# Patient Record
Sex: MALE | Race: WHITE | NOT HISPANIC OR LATINO | Employment: FULL TIME | URBAN - NONMETROPOLITAN AREA
[De-identification: names, ages, dates, MRNs, and addresses within clinical notes are randomized per-mention and may not be internally consistent; named-entity substitution may affect disease eponyms.]

---

## 2017-08-28 RX ORDER — ASPIRIN 81 MG/1
81 TABLET ORAL DAILY
COMMUNITY
End: 2017-10-05

## 2017-08-28 RX ORDER — CARVEDILOL 3.12 MG/1
3.12 TABLET ORAL
COMMUNITY
Start: 2017-08-23 | End: 2017-10-05

## 2017-08-28 RX ORDER — ATORVASTATIN CALCIUM 10 MG/1
10 TABLET, FILM COATED ORAL
COMMUNITY
Start: 2017-04-24 | End: 2018-04-20

## 2017-08-28 RX ORDER — VALSARTAN 320 MG/1
320 TABLET ORAL
COMMUNITY
Start: 2017-03-29

## 2017-08-28 RX ORDER — NITROGLYCERIN 0.4 MG/1
0.4 TABLET SUBLINGUAL
COMMUNITY
Start: 2017-08-22 | End: 2017-10-05

## 2017-08-28 RX ORDER — DICYCLOMINE HYDROCHLORIDE 10 MG/1
10 CAPSULE ORAL
COMMUNITY
Start: 2017-08-24 | End: 2017-08-29 | Stop reason: ALTCHOICE

## 2017-08-28 RX ORDER — ISOSORBIDE MONONITRATE 30 MG/1
15 TABLET, EXTENDED RELEASE ORAL
COMMUNITY
Start: 2017-08-25 | End: 2017-09-07

## 2017-08-29 ENCOUNTER — OFFICE VISIT (OUTPATIENT)
Dept: CARDIOLOGY | Facility: CLINIC | Age: 52
End: 2017-08-29

## 2017-08-29 VITALS
DIASTOLIC BLOOD PRESSURE: 78 MMHG | HEART RATE: 62 BPM | SYSTOLIC BLOOD PRESSURE: 152 MMHG | HEIGHT: 70 IN | OXYGEN SATURATION: 98 % | BODY MASS INDEX: 27.69 KG/M2 | WEIGHT: 193.38 LBS

## 2017-08-29 DIAGNOSIS — R07.2 PRECORDIAL PAIN: Primary | ICD-10-CM

## 2017-08-29 DIAGNOSIS — I10 ESSENTIAL HYPERTENSION: ICD-10-CM

## 2017-08-29 DIAGNOSIS — E78.2 MIXED HYPERLIPIDEMIA: ICD-10-CM

## 2017-08-29 PROCEDURE — 99214 OFFICE O/P EST MOD 30 MIN: CPT | Performed by: NURSE PRACTITIONER

## 2017-08-29 NOTE — PROGRESS NOTES
Subjective:     Chest Pain (chief complaint )  Referred: Dr BUBBA Valadez    History of Present Illness  The patient is a 52-year-old  gentleman with history of hypertension, hyperlipidemia who presents for evaluation of chest discomfort as outlined below.    Chest Pain:  Location: Substernal   Duration: Intermittent over two weeks  Quality: Sharp   Intensity: Mild to moderate  Precipitating factors: None identified  Aggravating factors: Activity  Alleviating factors: Rest and nitrate therapy  Associated symptoms: Shortness of air    Family history is significant for coronary artery disease in multiple first-degree relatives however does not appear to be a pattern of premature onset.    The patient reports cardiac evaluation with Dr. Conor Porter.  He indicates that he is scheduled for transthoracic echocardiogram and Lexiscan myocardial perfusion scan next week under the direction of this provider, however as he and his wife were not entirely comfortable with communication in the above-noted office decision was made to discuss further with primary care provider, Dr. Valadez.   Nitrate therapy, MPIOTR or along with carvedilol was initiated by Dr Perdomo.  Due to nausea associated with initial Imdur at 30 mg in following with the recommendation per Dr. Rory M.D. or has been decreased to half a tablet daily.  The patient reports improvement in his symptoms with the initiation of beta blocker and nitrate therapy.    Review of Systems   Constitution: Negative for chills, fever and malaise/fatigue.   Cardiovascular: Positive for chest pain. Negative for claudication, cyanosis, dyspnea on exertion, irregular heartbeat, leg swelling, near-syncope, orthopnea, palpitations, paroxysmal nocturnal dyspnea and syncope.   Gastrointestinal: Negative for bloating.   Neurological: Negative for dizziness and vertigo.     Past Medical History:   Diagnosis Date   • Osteoarthritis of foot joint    • Plantar fasciitis   "    History reviewed. No pertinent surgical history.    Social History     Social History   • Marital status:      Spouse name: N/A   • Number of children: N/A   • Years of education: N/A     Social History Main Topics   • Smoking status: Former Smoker     Types: Cigarettes     Quit date: 8/15/2017   • Smokeless tobacco: Never Used   • Alcohol use No   • Drug use: No   • Sexual activity: Defer     Other Topics Concern   • None     Social History Narrative     Current Outpatient Prescriptions   Medication Sig Dispense Refill   • amLODIPine (NORVASC) 5 MG tablet Take 5 mg by mouth Daily.     • aspirin 81 MG EC tablet Take 81 mg by mouth Daily.     • atorvastatin (LIPITOR) 10 MG tablet Take 10 mg by mouth.     • carvedilol (COREG) 3.125 MG tablet Take 3.125 mg by mouth.     • isosorbide mononitrate (IMDUR) 30 MG 24 hr tablet Take 15 mg by mouth.     • loratadine (CLARITIN) 10 MG tablet Take 10 mg by mouth Daily.     • nitroglycerin (NITROSTAT) 0.4 MG SL tablet Place 0.4 mg under the tongue.     • Tiotropium Bromide Monohydrate 2.5 MCG/ACT aerosol solution Inhale 5 mcg.     • valsartan (DIOVAN) 320 MG tablet Take 320 mg by mouth.       No current facility-administered medications for this visit.      Objective:     Vitals:    08/29/17 0857   BP: 152/78   BP Location: Left arm   Patient Position: Sitting   Cuff Size: Adult   Pulse: 62   SpO2: 98%   Weight: 193 lb 6 oz (87.7 kg)   Height: 70\" (177.8 cm)      Physical Exam   Constitutional: He is oriented to person, place, and time. He appears well-nourished. No distress.   HENT:   Head: Atraumatic.   Neck: No JVD present.   Cardiovascular: S1 normal and S2 normal.  Exam reveals no gallop, no S3 and no S4.    No murmur heard.  Pulmonary/Chest: Effort normal. No respiratory distress. He has no wheezes.   Abdominal: He exhibits no distension.   Musculoskeletal: He exhibits no edema.   Neurological: He is alert and oriented to person, place, and time.   Skin: Skin is " "warm and dry. He is not diaphoretic.   Psychiatric: He has a normal mood and affect. His behavior is normal. Judgment and thought content normal.   Vitals reviewed.    Flowsheet Rows         First Filed Value    Admission Height  70\" (177.8 cm) Documented at 08/29/2017 0857    Admission Weight  193 lb 6 oz (87.7 kg) Documented at 08/29/2017 0857      CBC With Differential (08/22/2017 4:50 PM)  CBC With Differential (08/22/2017 4:50 PM)   Component Value Ref Range   White Blood Cells 7.3 4.2 - 10.2   Red Blood Cells 4.46 4.10 - 5.70   Hemoglobin 13.9 12.9 - 17.4 g/dL   Hematocrit 41.1 37.6 - 49.5 %   MCV 92.2 80.0 - 97.0 fL   MCH 31.2 27.0 - 33.2 pg   MCHC 33.8 32.7 - 35.6 g/dL   RDW 12.8 12.7 - 15.7 %   Platelet Count 192 145 - 375   Percent Neutrophils 51.6 51.2 - 72.0 %   Percent Lymphs 31.9 21.7 - 43.3 %   Percent Monocytes 12.7 (H) 0.0 - 12.0 %   Percent Eosinophils 3.4 0.0 - 5.0 %   Percent basophil 0.4 0.0 - 2.0 %   Absolute Neutrophils 3.8 2.0 - 7.8   Absolute Lymphocytes 2.3 0.9 - 4.4   Absolute Monocytes 0.9 0.0 - 1.0   Absolute Eosiniphils 0.3 0.0 - 0.5   Absolute Basophils 0.0 0.0 - 0.2     CBC With Differential (08/22/2017 4:50 PM)   Specimen Performing Laboratory   Whole Blood [facility]    CLIA ID# [id number]    Dr. Danyel Francis (Director)    [address] [place] [id number]       [facility] (Mass MB) (08/22/2017 4:50 PM)  [facility] (Mass MB) (08/22/2017 4:50 PM)   Component Value Ref Range   Mass MB 0.87 0.00 - 2.37 ng/mL     [facility] (Mass MB) (08/22/2017 4:50 PM)   Specimen Performing Laboratory   Plasma [facility] LAB[facility]    CLIA ID# [id number]    Dr. Danyel Francis (Director)    [address] [place] [id number]       Comprehensive metabolic panel (08/22/2017 4:50 PM)  Comprehensive metabolic panel (08/22/2017 4:50 PM)   Component Value Ref Range   Glucose 91 65 - 99 mg/dL   BUN 16 9 - 21 mg/dL   Creatinine 1.01 0.66 - 1.25 mg/dL   Sodium 141 " 137.0 - 147.0 mmol/L   Potassium 4.3 3.5 - 5.1 mmol/L   Chloride 104 98.0 - 110.0 mmol/L   Carbon Dioxide 27 23.0 - 31.0 mmol/L   Calcium 9.6 8.40 - 10.40 mg/dL   Protein 7.3 6.60 - 8.70 g/dl   Albumin 4.4 3.40 - 4.80 g/dl   SGOT- AST 29 17 - 59 IU/L   SGPT- ALT 41 21 - 72 IU/L   Alkaline Phosphatase 74 38 - 134 IU/L   Bilirubin 0.30 0.2 - 1.30 mg/dL   Hours Post Prandial 3     Glomerular Filtration Rate, Est. 77 mL/min   If -American 93  Comment:       GFR    WITH KIDNEY DAMAGE     W/O DAMAGE  >=90         STAGE 1            NORMAL  60-89        STAGE 2            DEC GFR  30-59        STAGE 3            STAGE 3  15-29        STAGE 4            STAGE 4  <15          STAGE 5            STAGE 5      The MDRD GFR formula is valid only for adults b/w age19 and 70.           Cardiographics   Normal sinus rhythm~Normal ECG~No previous ECGs available~Confirmed by Barry Perdomo DO ([id number]) on 8/24/2017 4:12:10 PM     CXR: 08/22/2017  impression: borderline cardiomegaly, otherwise negative       The following portions of the patient's history were reviewed and updated as appropriate: allergies, current medications, past family history, past medical history, past social history, past surgical history and problem list.     Assessment:      Diagnosis Plan   1. Precordial pain  Stress Test With Myocardial Perfusion One Day    Adult Transthoracic Echo Complete   2. Essential hypertension  Adult Transthoracic Echo Complete   3. Mixed hyperlipidemia        Mr. Aguilar presents with chest discomfort in the setting of high probability for coronary artery disease based on pretest probability.  Chest pain does appear to be typical;   Electrocardiogram is noted with normal sinus rhythm with no evidence of ST-T segment abnormality;    Discussion with the patient regarding the above noted procedural plans for which all questions have been answered.  Transthoracic echocardiogram will be performed today - Dr. Mathur to  interpret.  Imdur and carvedilol will be held on the morning of noted date myocardial perfusion scan - Dr. Mathur to interpret.    Further recommendations will be based upon above findings/response.

## 2017-09-01 ENCOUNTER — TELEPHONE (OUTPATIENT)
Dept: CARDIOLOGY | Facility: CLINIC | Age: 52
End: 2017-09-01

## 2017-09-01 NOTE — TELEPHONE ENCOUNTER
Called and spoke with patient at 11:25 am informed him that his test hasnt been scheduled yet because the one call dept is still working on his insurance to approve the testing. Patient was thankful for the update and told him that if he had any other questions to contact us.

## 2017-09-06 ENCOUNTER — HOSPITAL ENCOUNTER (OUTPATIENT)
Dept: NUCLEAR MEDICINE | Facility: HOSPITAL | Age: 52
Discharge: HOME | End: 2017-09-06

## 2017-09-06 ENCOUNTER — HOSPITAL ENCOUNTER (OUTPATIENT)
Dept: CARDIOLOGY | Facility: HOSPITAL | Age: 52
Discharge: HOME | End: 2017-09-06

## 2017-09-06 ENCOUNTER — APPOINTMENT (OUTPATIENT)
Dept: NUCLEAR MEDICINE | Facility: HOSPITAL | Age: 52
End: 2017-09-06

## 2017-09-06 ENCOUNTER — APPOINTMENT (OUTPATIENT)
Dept: CARDIOLOGY | Facility: HOSPITAL | Age: 52
End: 2017-09-06

## 2017-09-06 DIAGNOSIS — R07.2 PRECORDIAL PAIN: ICD-10-CM

## 2017-09-06 LAB
BH CV STRESS BP STAGE 1: NORMAL
BH CV STRESS BP STAGE 2: NORMAL
BH CV STRESS BP STAGE 4: NORMAL
BH CV STRESS DURATION MIN STAGE 1: 3
BH CV STRESS DURATION MIN STAGE 2: 3
BH CV STRESS DURATION MIN STAGE 3: 3
BH CV STRESS DURATION MIN STAGE 4: 3
BH CV STRESS DURATION MIN STAGE 5: 1
BH CV STRESS DURATION SEC STAGE 1: 0
BH CV STRESS DURATION SEC STAGE 2: 0
BH CV STRESS DURATION SEC STAGE 3: 0
BH CV STRESS DURATION SEC STAGE 4: 0
BH CV STRESS DURATION SEC STAGE 5: 35
BH CV STRESS GRADE STAGE 1: 10
BH CV STRESS GRADE STAGE 2: 12
BH CV STRESS GRADE STAGE 3: 14
BH CV STRESS GRADE STAGE 4: 16
BH CV STRESS GRADE STAGE 5: 18
BH CV STRESS HR STAGE 1: 78
BH CV STRESS HR STAGE 2: 96
BH CV STRESS HR STAGE 3: 111
BH CV STRESS HR STAGE 4: 133
BH CV STRESS HR STAGE 5: 139
BH CV STRESS METS STAGE 1: 5
BH CV STRESS METS STAGE 2: 7.5
BH CV STRESS METS STAGE 3: 10
BH CV STRESS METS STAGE 4: 13.5
BH CV STRESS METS STAGE 5: 15
BH CV STRESS PROTOCOL 1: NORMAL
BH CV STRESS RECOVERY BP: NORMAL MMHG
BH CV STRESS RECOVERY HR: 74 BPM
BH CV STRESS SPEED STAGE 1: 1.7
BH CV STRESS SPEED STAGE 2: 2.5
BH CV STRESS SPEED STAGE 3: 3.4
BH CV STRESS SPEED STAGE 4: 4.2
BH CV STRESS SPEED STAGE 5: 5
BH CV STRESS STAGE 1: 1
BH CV STRESS STAGE 2: 2
BH CV STRESS STAGE 3: 3
BH CV STRESS STAGE 4: 4
BH CV STRESS STAGE 5: 5
LV EF NUC BP: 69 %
MAXIMAL PREDICTED HEART RATE: 166 BPM
PERCENT MAX PREDICTED HR: 83.73 %
STRESS BASELINE BP: NORMAL MMHG
STRESS BASELINE HR: 48 BPM
STRESS PERCENT HR: 99 %
STRESS POST ESTIMATED WORKLOAD: 13.7 METS
STRESS POST EXERCISE DUR MIN: 13 MIN
STRESS POST EXERCISE DUR SEC: 33 SEC
STRESS POST PEAK BP: NORMAL MMHG
STRESS POST PEAK HR: 139 BPM
STRESS TARGET HR: 141 BPM

## 2017-09-06 PROCEDURE — A9500 TC99M SESTAMIBI: HCPCS | Performed by: NURSE PRACTITIONER

## 2017-09-06 PROCEDURE — 78452 HT MUSCLE IMAGE SPECT MULT: CPT

## 2017-09-06 PROCEDURE — 93017 CV STRESS TEST TRACING ONLY: CPT

## 2017-09-06 PROCEDURE — 93016 CV STRESS TEST SUPVJ ONLY: CPT | Performed by: INTERNAL MEDICINE

## 2017-09-06 PROCEDURE — 93018 CV STRESS TEST I&R ONLY: CPT | Performed by: INTERNAL MEDICINE

## 2017-09-06 PROCEDURE — 78452 HT MUSCLE IMAGE SPECT MULT: CPT | Performed by: INTERNAL MEDICINE

## 2017-09-06 PROCEDURE — 0 TECHNETIUM SESTAMIBI: Performed by: NURSE PRACTITIONER

## 2017-09-06 RX ADMIN — TECHNETIUM TC-99M SESTAMIBI 1 DOSE: 1 INJECTION INTRAVENOUS at 08:51

## 2017-09-06 RX ADMIN — TECHNETIUM TC-99M SESTAMIBI 1 DOSE: 1 INJECTION INTRAVENOUS at 10:53

## 2017-09-07 ENCOUNTER — OFFICE VISIT (OUTPATIENT)
Dept: CARDIOLOGY | Facility: CLINIC | Age: 52
End: 2017-09-07

## 2017-09-07 VITALS
SYSTOLIC BLOOD PRESSURE: 126 MMHG | HEIGHT: 70 IN | DIASTOLIC BLOOD PRESSURE: 70 MMHG | HEART RATE: 57 BPM | OXYGEN SATURATION: 98 % | WEIGHT: 194.31 LBS | BODY MASS INDEX: 27.82 KG/M2

## 2017-09-07 DIAGNOSIS — R07.2 PRECORDIAL PAIN: Primary | ICD-10-CM

## 2017-09-07 DIAGNOSIS — I10 ESSENTIAL HYPERTENSION: ICD-10-CM

## 2017-09-07 DIAGNOSIS — I51.89 DIASTOLIC DYSFUNCTION: ICD-10-CM

## 2017-09-07 DIAGNOSIS — E78.2 MIXED HYPERLIPIDEMIA: ICD-10-CM

## 2017-09-07 PROCEDURE — 99213 OFFICE O/P EST LOW 20 MIN: CPT | Performed by: NURSE PRACTITIONER

## 2017-09-07 NOTE — PROGRESS NOTES
Subjective:     precordial pain (chief complaint )  Referred: Dr BUBBA Valadez    History of Present Illness  The patient is a 52-year-old  gentleman with history of hypertension, hyperlipidemia who presented for evaluation of chest discomfort On August 29.    Following evaluation, the patient was scheduled for exercise myocardial perfusion scan along with transthoracic echocardiogram.    The patient returns today for discussion of results    Review of Systems   Constitution: Negative for chills, fever and malaise/fatigue.   Cardiovascular: Positive for chest pain. Negative for claudication, cyanosis, dyspnea on exertion, irregular heartbeat, leg swelling, near-syncope, orthopnea, palpitations, paroxysmal nocturnal dyspnea and syncope.   Gastrointestinal: Negative for bloating.   Neurological: Negative for dizziness and vertigo.     Past Medical History:   Diagnosis Date   • Hyperlipidemia    • Hypertension    • Osteoarthritis of foot joint    • Plantar fasciitis      History reviewed. No pertinent surgical history.    Social History     Social History   • Marital status:      Spouse name: N/A   • Number of children: N/A   • Years of education: N/A     Social History Main Topics   • Smoking status: Former Smoker     Types: Cigarettes     Quit date: 8/15/2017   • Smokeless tobacco: Never Used   • Alcohol use No   • Drug use: No   • Sexual activity: Defer     Other Topics Concern   • None     Social History Narrative     Current Outpatient Prescriptions   Medication Sig Dispense Refill   • amLODIPine (NORVASC) 5 MG tablet Take 5 mg by mouth Daily.     • aspirin 81 MG EC tablet Take 81 mg by mouth Daily.     • atorvastatin (LIPITOR) 10 MG tablet Take 10 mg by mouth.     • carvedilol (COREG) 3.125 MG tablet Take 3.125 mg by mouth.     • loratadine (CLARITIN) 10 MG tablet Take 10 mg by mouth Daily.     • nitroglycerin (NITROSTAT) 0.4 MG SL tablet Place 0.4 mg under the tongue.     • Tiotropium Bromide  "Monohydrate 2.5 MCG/ACT aerosol solution Inhale 5 mcg.     • valsartan (DIOVAN) 320 MG tablet Take 320 mg by mouth.       No current facility-administered medications for this visit.      Objective:     Vitals:    09/07/17 1030   BP: 126/70   BP Location: Left arm   Patient Position: Sitting   Cuff Size: Adult   Pulse: 57   SpO2: 98%   Weight: 194 lb 5 oz (88.1 kg)   Height: 70\" (177.8 cm)      Physical Exam   Constitutional: He is oriented to person, place, and time. He appears well-nourished. No distress.   HENT:   Head: Atraumatic.   Pulmonary/Chest: Effort normal. No respiratory distress.   Neurological: He is alert and oriented to person, place, and time.   Skin: Skin is warm and dry.   Psychiatric: He has a normal mood and affect. His behavior is normal. Judgment and thought content normal.   Vitals reviewed.    Flowsheet Rows         First Filed Value    Admission Height  70\" (177.8 cm) Documented at 08/29/2017 0857    Admission Weight  193 lb 6 oz (87.7 kg) Documented at 08/29/2017 0857      CBC With Differential (08/22/2017 4:50 PM)  CBC With Differential (08/22/2017 4:50 PM)   Component Value Ref Range   White Blood Cells 7.3 4.2 - 10.2   Red Blood Cells 4.46 4.10 - 5.70   Hemoglobin 13.9 12.9 - 17.4 g/dL   Hematocrit 41.1 37.6 - 49.5 %   MCV 92.2 80.0 - 97.0 fL   MCH 31.2 27.0 - 33.2 pg   MCHC 33.8 32.7 - 35.6 g/dL   RDW 12.8 12.7 - 15.7 %   Platelet Count 192 145 - 375   Percent Neutrophils 51.6 51.2 - 72.0 %   Percent Lymphs 31.9 21.7 - 43.3 %   Percent Monocytes 12.7 (H) 0.0 - 12.0 %   Percent Eosinophils 3.4 0.0 - 5.0 %   Percent basophil 0.4 0.0 - 2.0 %   Absolute Neutrophils 3.8 2.0 - 7.8   Absolute Lymphocytes 2.3 0.9 - 4.4   Absolute Monocytes 0.9 0.0 - 1.0   Absolute Eosiniphils 0.3 0.0 - 0.5   Absolute Basophils 0.0 0.0 - 0.2     CBC With Differential (08/22/2017 4:50 PM)   Specimen Performing Laboratory   Whole Blood [facility]    CLIA ID# [id number]    Dr. Danyel Francis " (Director)    [address] [place] [id number]       [facility] (Mass MB) (08/22/2017 4:50 PM)  [facility] (Mass MB) (08/22/2017 4:50 PM)   Component Value Ref Range   Mass MB 0.87 0.00 - 2.37 ng/mL     [facility] (Mass MB) (08/22/2017 4:50 PM)   Specimen Performing Laboratory   Plasma [facility]    CLIA ID# [id number]    Dr. Danyel Francis (Director)    [address] [place] [address]       Comprehensive metabolic panel (08/22/2017 4:50 PM)  Comprehensive metabolic panel (08/22/2017 4:50 PM)   Component Value Ref Range   Glucose 91 65 - 99 mg/dL   BUN 16 9 - 21 mg/dL   Creatinine 1.01 0.66 - 1.25 mg/dL   Sodium 141 137.0 - 147.0 mmol/L   Potassium 4.3 3.5 - 5.1 mmol/L   Chloride 104 98.0 - 110.0 mmol/L   Carbon Dioxide 27 23.0 - 31.0 mmol/L   Calcium 9.6 8.40 - 10.40 mg/dL   Protein 7.3 6.60 - 8.70 g/dl   Albumin 4.4 3.40 - 4.80 g/dl   SGOT- AST 29 17 - 59 IU/L   SGPT- ALT 41 21 - 72 IU/L   Alkaline Phosphatase 74 38 - 134 IU/L   Bilirubin 0.30 0.2 - 1.30 mg/dL   Hours Post Prandial 3     Glomerular Filtration Rate, Est. 77 mL/min   If -American 93  Comment:       GFR    WITH KIDNEY DAMAGE     W/O DAMAGE  >=90         STAGE 1            NORMAL  60-89        STAGE 2            DEC GFR  30-59        STAGE 3            STAGE 3  15-29        STAGE 4            STAGE 4  <15          STAGE 5            STAGE 5      The MDRD GFR formula is valid only for adults b/w age17 and 70.           Cardiographics   Normal sinus rhythm~Normal ECG~No previous ECGs available~Confirmed by Barry Perdomo DO ([id number]) on 8/24/2017 4:12:10 PM     CXR: 08/22/2017  impression: borderline cardiomegaly, otherwise negative       TTE: 08/29/2017  · Left Ventricle: Estimated EF appears to be in the range of 61 - 65%. Normal left ventricular cavity size noted.  · Left ventricular diastolic dysfunction is noted (grade II w/high LAP) consistent with pseudonormalization. Elevated left atrial pressure.  · Right  Ventricle: Normal right ventricular cavity size, wall thickness, systolic function and septal motion noted  · Aortic Valve: The aortic valve is abnormal in structure. There is calcification of the aortic valve  · Mitral Valve: The mitral valve is abnormal in structure. Mitral annular calcification is present. Mild mitral valve regurgitation is present.  · No evidence of pulmonary hypertension is present.    MPS: 09/06/2017  · Nuclear Study Description: A 1-day rest/stress protocol myocardial perfusion imaging study was performed  · Nuclear Perfusion Images: Overall image quality is excellent  · Stress Description: A stress test was performed following the Eyal protocol  · The patient reported chest discomfort and non-exercise-limiting angina during the stress test.  · Ventricle Size / Description: Left ventricular ejection fraction is normal (Calculated EF = 69%).  · Impressions are consistent with a low risk study      The following portions of the patient's history were reviewed and updated as appropriate: allergies, current medications, past family history, past medical history, past social history, past surgical history and problem list.     Assessment:      Diagnosis Plan   1. Precordial pain     2. Essential hypertension     3. Diastolic dysfunction     4. Mixed hyperlipidemia       Mr. Aguilar is a 52-year-old  gentleman who presents to the office for discussion of procedural results regarding presentation and evaluation of chest discomfort.    Discussion regarding transthoracic echocardiogram and myocardial perfusion scan with all questions answered.    We did have lengthy discussion regarding grade II diastolic dysfunction and at the end of that discussion the patient appears to understand.  No further questions.    Activity: Approximately 150 minutes of moderate activity (such as walking program)  per week (20-30 minutes most days of the week)  Diet: Heart healthy foods - more fresh fruits and  vegetables and whole grains; less red meat; more fish and poultry that is baked or grilled - not fried; less salt not to exceed 2000 mg daily - less processed food; No trans or saturated fat - DASH diet  Non Smoker  Blood pressure management  Weight management  Stress management    Instructed to discontinue Imdur and follow-up with Dr. Valadez regarding antihypertensive therapy.

## 2017-10-05 ENCOUNTER — OFFICE VISIT (OUTPATIENT)
Dept: PODIATRY | Facility: CLINIC | Age: 52
End: 2017-10-05

## 2017-10-05 VITALS
BODY MASS INDEX: 27.77 KG/M2 | DIASTOLIC BLOOD PRESSURE: 82 MMHG | HEIGHT: 70 IN | SYSTOLIC BLOOD PRESSURE: 138 MMHG | WEIGHT: 194 LBS | OXYGEN SATURATION: 97 % | HEART RATE: 60 BPM

## 2017-10-05 DIAGNOSIS — G57.51 TARSAL TUNNEL SYNDROME OF RIGHT SIDE: Primary | ICD-10-CM

## 2017-10-05 DIAGNOSIS — M79.671 RIGHT FOOT PAIN: ICD-10-CM

## 2017-10-05 PROCEDURE — 99203 OFFICE O/P NEW LOW 30 MIN: CPT | Performed by: PODIATRIST

## 2017-10-05 RX ORDER — CLONIDINE HYDROCHLORIDE 0.1 MG/1
0.1 TABLET ORAL 2 TIMES DAILY
COMMUNITY

## 2017-10-05 NOTE — PROGRESS NOTES
Nicolás Aguilar  1965  52 y.o. male   Dr. Valadez  Patient presents today for second opinion of his right heel pain.    10/05/2017  Chief Complaint   Patient presents with   • Right Foot - Pain           History of Present Illness    Nicolás Aguilar is a 52 y.o. male who presents for evaluation of right foot pain.  He states pain is primarily located on the inside of his heel.  He states been ongoing for over 2 years.  He describes the pain as sharp worse with prolonged weightbearing and occasionally tingling in nature.  He has been seen by  in [facility] for this issue.  He has received several corticosteroid injections with no relief.  He is also change shoes and orthotics which he states offered temporary relief but typically the pain returned within a week or 2.  Most recently he was told that he has tarsal tunnel and was recommended sclerosing alcohol injections or surgery.  He denies any trauma or injuries.      Past Medical History:   Diagnosis Date   • Hyperlipidemia    • Hypertension    • Osteoarthritis of foot joint    • Plantar fasciitis          No past surgical history on file.      Family History   Problem Relation Age of Onset   • Hypertension Mother    • Hypertension Father    • Heart attack Father          Social History     Social History   • Marital status:      Spouse name: N/A   • Number of children: N/A   • Years of education: N/A     Occupational History   • Not on file.     Social History Main Topics   • Smoking status: Former Smoker     Types: Cigarettes     Quit date: 8/15/2017   • Smokeless tobacco: Never Used   • Alcohol use No   • Drug use: No   • Sexual activity: Defer     Other Topics Concern   • Not on file     Social History Narrative         Current Outpatient Prescriptions   Medication Sig Dispense Refill   • amLODIPine (NORVASC) 5 MG tablet Take 5 mg by mouth Daily.     • atorvastatin (LIPITOR) 10 MG tablet Take 10 mg by mouth.     • CloNIDine  "(CATAPRES) 0.1 MG tablet Take 0.1 mg by mouth 2 (Two) Times a Day.     • valsartan (DIOVAN) 320 MG tablet Take 320 mg by mouth.       No current facility-administered medications for this visit.          OBJECTIVE    /82  Pulse 60  Ht 70\" (177.8 cm)  Wt 194 lb (88 kg)  SpO2 97%  BMI 27.84 kg/m2      Review of Systems   Constitutional:  Denies recent weight loss, weight gain, fever or chills, no change in exercise tolerance  Musculoskeletal: Heel/foot pain.   Skin: No wounds or lesions  Neurological: Tingling sensations  Psychiatric/Behavioral: Denies depression      Physical Exam   Constitutional: he appears well-developed and well-nourished.   HEENT: Normocephalic. Atraumatic.  CV: No CP. RRR  Resp: Non-labored respirations.  Psychiatric: he has a normal mood and affect. his behavior is normal.         Lower Extremity Exam:  Vascular: DP/PT pulses palpable 2+.   No edema  Foot warm  Neuro: Protective sensation intact, b/l.  DTRs intact  Positive Tinel over tarsal tunnel, Medial calcaneal nerve  Integument: No open wounds or lesions.  No erythema, scaling  No masses  Musculoskeletal: LE muscle strength 5/5.   Gait normal  Ankle ROM decreased without pain or crepitus  STJ ROM full without pain or crepitus  Pain on palpation of ricardo pedis, right        Right foot radiographs- Normal osseous density. No acute fractures, subluxations or erosions.        ASSESSMENT AND PLAN    Nicolás was seen today for pain.    Diagnoses and all orders for this visit:    Tarsal tunnel syndrome of right side    Right foot pain  -     XR Foot 3+ View Right    -Comprehensive foot and ankle exam performed  -Radiographs ordered and reviewed  -Educated pt on diagnosis, etiology and treatment of suspected tarsal tunnel syndrome  -Continue motion control shoe,  over-the-counter inserts  -Patient to attempt to obtain his prior MRI for review.  We did discuss possible tarsal tunnel release pending review of his MRI.  -Recheck 4 " weeks          This document has been electronically signed by Steven Marx DPM on October 11, 2017 7:33 PM     EMR Dragon/Transcription disclaimer:   Much of this encounter note is an electronic transcription/translation of spoken language to printed text. The electronic translation of spoken language may permit erroneous, or at times, nonsensical words or phrases to be inadvertently transcribed; Although I have reviewed the note for such errors, some may still exist.    Steven Marx DPM  10/11/2017  7:33 PM

## 2020-06-14 LAB — SARS-COV-2 N GENE RESP QL NAA+PROBE: NOT DETECTED

## 2020-06-14 PROCEDURE — 87635 SARS-COV-2 COVID-19 AMP PRB: CPT | Performed by: INTERNAL MEDICINE

## 2020-06-17 ENCOUNTER — HOSPITAL ENCOUNTER (OUTPATIENT)
Facility: HOSPITAL | Age: 55
Setting detail: HOSPITAL OUTPATIENT SURGERY
Discharge: HOME | End: 2020-06-17
Attending: INTERNAL MEDICINE | Admitting: INTERNAL MEDICINE

## 2020-06-17 ENCOUNTER — ANESTHESIA EVENT (OUTPATIENT)
Dept: GASTROENTEROLOGY | Facility: HOSPITAL | Age: 55
End: 2020-06-17

## 2020-06-17 ENCOUNTER — ANESTHESIA (OUTPATIENT)
Dept: GASTROENTEROLOGY | Facility: HOSPITAL | Age: 55
End: 2020-06-17

## 2020-06-17 VITALS
BODY MASS INDEX: 27.94 KG/M2 | DIASTOLIC BLOOD PRESSURE: 66 MMHG | HEART RATE: 55 BPM | HEIGHT: 70 IN | TEMPERATURE: 97.4 F | SYSTOLIC BLOOD PRESSURE: 115 MMHG | OXYGEN SATURATION: 96 % | RESPIRATION RATE: 16 BRPM | WEIGHT: 195.13 LBS

## 2020-06-17 DIAGNOSIS — Z86.010 PERSONAL HISTORY OF COLONIC POLYPS: ICD-10-CM

## 2020-06-17 PROCEDURE — 25010000002 PROPOFOL 10 MG/ML EMULSION: Performed by: NURSE ANESTHETIST, CERTIFIED REGISTERED

## 2020-06-17 RX ORDER — ONDANSETRON 2 MG/ML
4 INJECTION INTRAMUSCULAR; INTRAVENOUS ONCE AS NEEDED
Status: DISCONTINUED | OUTPATIENT
Start: 2020-06-17 | End: 2020-06-17 | Stop reason: HOSPADM

## 2020-06-17 RX ORDER — TAMSULOSIN HYDROCHLORIDE 0.4 MG/1
1 CAPSULE ORAL DAILY
COMMUNITY

## 2020-06-17 RX ORDER — ASPIRIN 81 MG/1
81 TABLET, CHEWABLE ORAL DAILY
COMMUNITY

## 2020-06-17 RX ORDER — CLOPIDOGREL BISULFATE 75 MG/1
75 TABLET ORAL DAILY
COMMUNITY

## 2020-06-17 RX ORDER — PROPOFOL 10 MG/ML
VIAL (ML) INTRAVENOUS AS NEEDED
Status: DISCONTINUED | OUTPATIENT
Start: 2020-06-17 | End: 2020-06-17 | Stop reason: SURG

## 2020-06-17 RX ORDER — DEXTROSE AND SODIUM CHLORIDE 5; .45 G/100ML; G/100ML
30 INJECTION, SOLUTION INTRAVENOUS CONTINUOUS PRN
Status: DISCONTINUED | OUTPATIENT
Start: 2020-06-17 | End: 2020-06-17 | Stop reason: HOSPADM

## 2020-06-17 RX ORDER — LIDOCAINE HYDROCHLORIDE 20 MG/ML
INJECTION, SOLUTION INTRAVENOUS AS NEEDED
Status: DISCONTINUED | OUTPATIENT
Start: 2020-06-17 | End: 2020-06-17 | Stop reason: SURG

## 2020-06-17 RX ORDER — ATORVASTATIN CALCIUM 20 MG/1
20 TABLET, FILM COATED ORAL DAILY
COMMUNITY

## 2020-06-17 RX ADMIN — PROPOFOL 20 MG: 10 INJECTION, EMULSION INTRAVENOUS at 13:53

## 2020-06-17 RX ADMIN — PROPOFOL 10 MG: 10 INJECTION, EMULSION INTRAVENOUS at 14:02

## 2020-06-17 RX ADMIN — LIDOCAINE HYDROCHLORIDE 60 MG: 20 INJECTION, SOLUTION INTRAVENOUS at 13:51

## 2020-06-17 RX ADMIN — PROPOFOL 20 MG: 10 INJECTION, EMULSION INTRAVENOUS at 14:01

## 2020-06-17 RX ADMIN — PROPOFOL 20 MG: 10 INJECTION, EMULSION INTRAVENOUS at 13:59

## 2020-06-17 RX ADMIN — PROPOFOL 90 MG: 10 INJECTION, EMULSION INTRAVENOUS at 13:51

## 2020-06-17 RX ADMIN — PROPOFOL 20 MG: 10 INJECTION, EMULSION INTRAVENOUS at 13:55

## 2020-06-17 RX ADMIN — DEXTROSE AND SODIUM CHLORIDE 30 ML/HR: 5; 450 INJECTION, SOLUTION INTRAVENOUS at 13:43

## 2020-06-17 RX ADMIN — PROPOFOL 20 MG: 10 INJECTION, EMULSION INTRAVENOUS at 13:57

## 2020-06-17 NOTE — H&P
Ron Kay DO,[facility]  Gastroenterology  Hepatology  Endoscopy  Board Certified in Internal Medicine and gastroenterology  [address], [address]  [place]. [address]  - [phone]   F - [phone]     GASTROENTEROLOGY HISTORY AND PHYSICAL  NOTE   RON KAY DO.         SUBJECTIVE:   2020    Name: Nicolás Aguilar  DOD: 1965        Chief Complaint:       Subjective : Personal history of colon polyps    Patient is 55 y.o. male presents with desire for elective colonoscopy.      ROS/HISTORY/ CURRENT MEDICATIONS/OBJECTIVE/VS/PE:   Review of Systems:  All systems unremarkable unless specified below.  Constitutional   HENT  Eyes   Respiratory    Cardiovascular  Gastrointestinal   Endocrine  Genitourinary    Musculoskeletal   Skin  Allergic/Immunologic    Neurological    Hematological  Psychiatric/Behavioral    History:     Past Medical History:   Diagnosis Date   • Hyperlipidemia    • Hypertension    • Osteoarthritis of foot joint    • Plantar fasciitis      No past surgical history on file.  Family History   Problem Relation Age of Onset   • Hypertension Mother    • Hypertension Father    • Heart attack Father      Social History     Tobacco Use   • Smoking status: Former Smoker     Types: Cigarettes     Last attempt to quit: 8/15/2017     Years since quittin.8   • Smokeless tobacco: Never Used   Substance Use Topics   • Alcohol use: No   • Drug use: No     Prior to Admission medications    Medication Sig Start Date End Date Taking? Authorizing Provider   amLODIPine (NORVASC) 5 MG tablet Take 5 mg by mouth Daily.    ProviderBrenda MD   CloNIDine (CATAPRES) 0.1 MG tablet Take 0.1 mg by mouth 2 (Two) Times a Day.    Brenda Zuñiga MD   valsartan (DIOVAN) 320 MG tablet Take 320 mg by mouth. 3/29/17   Brenda Zuñiga MD     Allergies:  Augmentin [amoxicillin-pot clavulanate]    I have reviewed the patients medical history, surgical history and family history in  "the available medical record system.     Current Medications:     No current facility-administered medications for this encounter.      Current Outpatient Medications   Medication Sig Dispense Refill   • amLODIPine (NORVASC) 5 MG tablet Take 5 mg by mouth Daily.     • CloNIDine (CATAPRES) 0.1 MG tablet Take 0.1 mg by mouth 2 (Two) Times a Day.     • valsartan (DIOVAN) 320 MG tablet Take 320 mg by mouth.         Objective     Physical Exam:   BP: ()/()   Arterial Line BP: ()/()   /66   Pulse 55   Temp 97.4 °F (36.3 °C)   Resp 16   Ht 177.8 cm (70\")   Wt 88.5 kg (195 lb 2 oz)   SpO2 96%   BMI 28.00 kg/m²   Physical Exam:  General Appearance:    Alert, cooperative, in no acute distress   Head:    Normocephalic, without obvious abnormality, atraumatic   Eyes:            Lids and lashes normal, conjunctivae and sclerae normal, no   icterus, no pallor, corneas clear, PERRLA   Ears:    Ears appear intact with no abnormalities noted   Throat:   No oral lesions, no thrush, oral mucosa moist   Neck:   No adenopathy, supple, trachea midline, no thyromegaly, no     carotid bruit, no JVD   Back:     No kyphosis present, no scoliosis present, no skin lesions,       erythema or scars, no tenderness to percussion or                   palpation,   range of motion normal   Lungs:     Clear to auscultation,respirations regular, even and                   unlabored    Heart:    Regular rhythm and normal rate, normal S1 and S2, no            murmur, no gallop, no rub, no click   Breast Exam:    Deferred   Abdomen:     Normal bowel sounds, no masses, no organomegaly, soft        non-tender, non-distended, no guarding, no rebound                 tenderness   Genitalia:    Deferred   Extremities:   Moves all extremities well, no edema, no cyanosis, no              redness   Pulses:   Pulses palpable and equal bilaterally   Skin:   No bleeding, bruising or rash   Lymph nodes:   No palpable adenopathy   Neurologic:   Cranial " nerves 2 - 12 grossly intact, sensation intact, DTR        present and equal bilaterally      Results Review:     No results found for: WBC, HGB, HCT, PLT          No results found for: LIPASE  No results found for: INR  No results found for: BLOODCX    Radiology Review:  Imaging Results (Last 72 Hours)     ** No results found for the last 72 hours. **           I reviewed the patient's new clinical results.  I reviewed the patient's new imaging results and agree with the interpretation.     ASSESSMENT/PLAN:   ASSESSMENT:  1.  Personal history of colon polyps    PLAN:  1.  Colonoscopy    Risk and benefits associated with the procedure are reviewed with the patient.  Patient wished to proceed     Steven Jain DO  06/17/20  12:45

## 2020-06-17 NOTE — ANESTHESIA PREPROCEDURE EVALUATION
Anesthesia Evaluation     Patient summary reviewed   NPO Solid Status: > 8 hours  NPO Liquid Status: > 4 hours           Airway   Mallampati: II  TM distance: >3 FB  Dental      Pulmonary - normal exam   (+) a smoker Former,   Cardiovascular - normal exam    PT is on anticoagulation therapy    (+) hypertension 2 medications or greater, hyperlipidemia,     ROS comment: ECHO 8/29/17  Left Ventricle Estimated EF appears to be in the range of 61 - 65%. Normal left ventricular cavity size noted. All left ventricular wall segments contract normally. Left ventricular wall thickness is consistent with moderate concentric hypertrophy. Left ventricular diastolic dysfunction is noted (grade II w/high LAP) consistent with pseudonormalization. Elevated left atrial pressure. There is no evidence of a left ventricular mass or thrombus present.  Right Ventricle Normal right ventricular cavity size, wall thickness, systolic function and septal motion noted. No evidence of a right ventricular thrombus present. No evidence of a right ventricular mass present.  Left Atrium Left atrial cavity size is mildly dilated.  Right Atrium Normal right atrial size noted. The inferior vena cava is normally sized. IVC inspiratory collapse is absent. No evidence of a right atrial thrombus present. No evidence of a right atrial mass present.  Aortic Valve The aortic valve is abnormal in structure. There is calcification of the aortic valve.Trace aortic valve regurgitation is present.  Mitral Valve The mitral valve is abnormal in structure. Mitral annular calcification is present. Mild mitral valve regurgitation is present.  Tricuspid Valve The tricuspid valve is normal. Physiologic tricuspid valve regurgitation is present. Estimated right ventricular systolic pressure from tricuspid regurgitation is normal (<35 mmHg). Calculated right ventricular systolic pressure from tricuspid regurgitation is 34 mmHg. No evidence of pulmonary hypertension is  present.  Pulmonic Valve The pulmonic valve was not well visualized.  Greater Vessels No dilation of the aortic root is present. No dilation of the sinuses of Valsalva is present.  Pericardium The pericardium is normal. There is no evidence of pericardial effusion.    Neuro/Psych  GI/Hepatic/Renal/Endo      Musculoskeletal     Abdominal    Substance History      OB/GYN          Other   arthritis,                      Anesthesia Plan    ASA 3     MAC     intravenous induction     Anesthetic plan, all risks, benefits, and alternatives have been provided, discussed and informed consent has been obtained with: patient.

## 2020-06-17 NOTE — ANESTHESIA POSTPROCEDURE EVALUATION
Patient: Nicolás Aguilar    Procedure Summary     Date:  06/17/20 Room / Location:  [facility] ENDOSCOPY 2 / [facility] ENDOSCOPY    Anesthesia Start:  1345 Anesthesia Stop:  1408    Procedure:  COLONOSCOPY (N/A ) Diagnosis:       Colon polyp      (Personal history of colonic polyps [Z86.010])    Surgeon:  Steven Jain DO Provider:  Shoaib Vazquez CRNA    Anesthesia Type:  MAC ASA Status:  3          Anesthesia Type: MAC    Vitals  No vitals data found for the desired time range.          Post Anesthesia Care and Evaluation    Patient location during evaluation: PACU  Level of consciousness: awake  Pain score: 0  Pain management: adequate  Airway patency: patent  Anesthetic complications: No anesthetic complications  PONV Status: none  Cardiovascular status: acceptable and hemodynamically stable  Respiratory status: acceptable and spontaneous ventilation  Hydration status: acceptable

## 2020-06-22 LAB
LAB AP CASE REPORT: NORMAL
PATH REPORT.FINAL DX SPEC: NORMAL

## (undated) DEVICE — CONMED COLONOSCOPE SHEATHED CYTOLOGY BRUSH, RING HANDLE, 3 MM X 230 CM: Brand: CONMED

## (undated) DEVICE — SINGLE-USE BIOPSY FORCEPS: Brand: RADIAL JAW 4

## (undated) DEVICE — CANN SMPL SOFTECH BIFLO ETCO2 A/M 7FT